# Patient Record
Sex: FEMALE | Race: WHITE | NOT HISPANIC OR LATINO | URBAN - METROPOLITAN AREA
[De-identification: names, ages, dates, MRNs, and addresses within clinical notes are randomized per-mention and may not be internally consistent; named-entity substitution may affect disease eponyms.]

---

## 2023-03-29 ENCOUNTER — OFFICE (OUTPATIENT)
Dept: URBAN - METROPOLITAN AREA CLINIC 16 | Facility: CLINIC | Age: 43
End: 2023-03-29

## 2023-03-29 VITALS
WEIGHT: 224 LBS | HEART RATE: 74 BPM | SYSTOLIC BLOOD PRESSURE: 120 MMHG | HEIGHT: 67 IN | DIASTOLIC BLOOD PRESSURE: 86 MMHG

## 2023-03-29 DIAGNOSIS — K22.70 BARRETT'S ESOPHAGUS WITHOUT DYSPLASIA: ICD-10-CM

## 2023-03-29 DIAGNOSIS — K21.9 GASTRO-ESOPHAGEAL REFLUX DISEASE WITHOUT ESOPHAGITIS: ICD-10-CM

## 2023-03-29 DIAGNOSIS — R11.2 NAUSEA WITH VOMITING, UNSPECIFIED: ICD-10-CM

## 2023-03-29 DIAGNOSIS — K90.0 CELIAC DISEASE: ICD-10-CM

## 2023-03-29 PROCEDURE — 99204 OFFICE O/P NEW MOD 45 MIN: CPT | Performed by: INTERNAL MEDICINE

## 2023-03-29 NOTE — SERVICENOTES
Have her take PPI twice a day and do small meals and low residue diet.  I would like her to manage stress in case this is complicating as well.  Regardless if all of this is negative she may need further repeat imaging such as ultrasound of the gallbladder HIDA scan etc. she will continue Zofran for her nausea.  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
27-Mar-2022 14:19

## 2023-03-29 NOTE — SERVICEHPINOTES
April has been a patient of digestive specialists and has had EGDs in the past including at the hospital at [facility] with Dr. Jennings last Thursday. 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 She reviewed her findings from the report of February 2017 and with a regular Z-line concerning for Lindsay's. She is also noted to have a history of H. pylori gastritis. Pathology revealed Lindsay's esophagus.
br
brOn her recent exam last week there was a appearance of intermittent spastic ventricularization of the proximal esophagus that could be spasm or even EOE there was no firing however and cold biopsies were obtained to rule out EOE. Similar as before irregular Z-line was noted without definitive Lindsay's at least and moderate gastritis was noted in the stomach and biopsies were taken of the antrum to rule out H. pylori 
shanthi Stanley would otherwise be due for colonoscopy at age 46 and has no prior colonoscopy to be performed. April was able to review her pathology from my chart and I was able to see the biopsy results there was no H. pylori on the gastric specimen. There was no evidence of a EOE or Lindsay's of the proximal esophagus the distal esophagus did not show Lindsay's esophagus at this time